# Patient Record
Sex: MALE | Race: WHITE | NOT HISPANIC OR LATINO | Employment: UNEMPLOYED | URBAN - METROPOLITAN AREA
[De-identification: names, ages, dates, MRNs, and addresses within clinical notes are randomized per-mention and may not be internally consistent; named-entity substitution may affect disease eponyms.]

---
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
## 2024-02-06 ENCOUNTER — HOSPITAL ENCOUNTER (EMERGENCY)
Facility: CLINIC | Age: 19
Discharge: HOME | End: 2024-02-06
Attending: EMERGENCY MEDICINE | Admitting: EMERGENCY MEDICINE
Payer: COMMERCIAL

## 2024-02-06 VITALS
HEIGHT: 72 IN | HEART RATE: 94 BPM | RESPIRATION RATE: 16 BRPM | BODY MASS INDEX: 27.22 KG/M2 | DIASTOLIC BLOOD PRESSURE: 77 MMHG | TEMPERATURE: 98.4 F | WEIGHT: 201 LBS | SYSTOLIC BLOOD PRESSURE: 127 MMHG | OXYGEN SATURATION: 100 %

## 2024-02-06 DIAGNOSIS — T15.01XA FOREIGN BODY OF RIGHT CORNEA, INITIAL ENCOUNTER: ICD-10-CM

## 2024-02-06 PROCEDURE — 99283 EMERGENCY DEPT VISIT LOW MDM: CPT | Performed by: EMERGENCY MEDICINE

## 2024-02-06 PROCEDURE — 99283 EMERGENCY DEPT VISIT LOW MDM: CPT

## 2024-02-06 RX ORDER — ERYTHROMYCIN 5 MG/G
0.5 OINTMENT OPHTHALMIC 4 TIMES DAILY
Qty: 3.5 G | Refills: 0 | Status: SHIPPED | OUTPATIENT
Start: 2024-02-06 | End: 2024-02-11

## 2024-02-06 RX ORDER — TETRACAINE HYDROCHLORIDE 5 MG/ML
1-2 SOLUTION OPHTHALMIC ONCE
Status: DISCONTINUED | OUTPATIENT
Start: 2024-02-06 | End: 2024-02-06 | Stop reason: HOSPADM

## 2024-02-06 NOTE — ED TRIAGE NOTES
Patient c/o right eye pain x 2.5 days. He thinks he has something in his eye.     Triage Assessment (Adult)       Row Name 02/06/24 0945          Triage Assessment    Airway WDL WDL        Respiratory WDL    Respiratory WDL WDL        Skin Circulation/Temperature WDL    Skin Circulation/Temperature WDL WDL

## 2024-02-06 NOTE — ED PROVIDER NOTES
History     chief complaint  HPI  Patient is a 18-year-old healthy male present with chief complaint of 2 days of right eye pain.  He was driving his tractor and when he came into the house he noticed right eye pain.  Feels like something is stuck in there.  No vision changes.  Continuous clear discharge.  Does not wear contacts.    Review of Systems:  All organ systems below were reviewed and are negative unless indicated in the HPI.    Constitutional  Eyes  Neuro    Allergies:  Allergies   Allergen Reactions    Amoxicillin Rash       Problem List:    Patient Active Problem List    Diagnosis Date Noted    Stress fracture of tibia or fibula 2003     Priority: Medium        Past Medical History:    History reviewed. No pertinent past medical history.    Past Surgical History:    Past Surgical History:   Procedure Laterality Date    HERNIA REPAIR, INGUINAL RT/LT  06/23/10    Right    TONSILLECTOMY & ADENOIDECTOMY  07/13/10       Family History:    Family History   Problem Relation Age of Onset    Heart Disease Paternal Aunt         MI    Hypertension Maternal Grandmother     Hyperlipidemia Maternal Grandmother     Hypertension Maternal Grandfather     Hyperlipidemia Maternal Grandfather     Hypertension Paternal Grandmother     Hyperlipidemia Paternal Grandmother     Hyperlipidemia Paternal Grandfather     Hypertension Paternal Grandfather        Medications:    erythromycin (ROMYCIN) 5 MG/GM ophthalmic ointment  cetirizine (ZYRTEC) 10 MG tablet  fluticasone (FLONASE) 50 MCG/ACT spray          Physical Exam   BP: 127/77  Pulse: 94  Temp: 98.4  F (36.9  C)  Resp: 16  Height: 182.9 cm (6')  Weight: 91.2 kg (201 lb)  SpO2: 100 %        Gen: Vital signs reviewed  Eyes: Right sclera injected.  He has metallic foreign body embedded in the right cornea off the midline at the 9 o'clock position.  Negative Francisco sign.  He also has a metallic foreign body overlying the sclera at the 7 o'clock position.  Nothing on  eyelid eversion.  He has 20/20 vision.  ENT: External ears and nares normal  Card: Appears well-perfused  Resp: No respiratory distress.   Extremities: Without obvious deformity  Skin: Dry  Neuro: Alert.    ED [facility]    Foreign Body Removal - Ocular    Date/Time: 2/6/2024 12:32 PM    Performed by: Rich Will MD  Authorized by: Rich Will MD    Risks, benefits and alternatives discussed.      LOCATION     Location:  R corneal    Depth:  Embedded    PRE PROCEDURE DETAILS:     Imaging:  None    OS visual acuity:  20/20    Correction: uncorrected      Fluorescein exam: yes      Fluorescein uptake: yes      Francisco test: negative      Corneal abrasion description:  Metallic foreign body embedded in the right cornea at the 9 o'clock position.    Corneal abrasion location:  Lateral    ANESTHESIA (see MAR for exact dosages):     Local anesthetic:  Tetracaine drops    PROCEDURE DETAILS     Localization method:  Slit lamp    Removal mechanism:  Corneal arlette and moist cotton swab    Foreign bodies recovered: Scleral foreign body removed and part of the corneal foreign body removed.    Intact foreign body removal: no      PROCEDURE    Patient Tolerance:  Patient tolerated the procedure well with no immediate complications             No results found for this or any previous visit (from the past 24 hour(s)).    Medications   tetracaine (PONTOCAINE) 0.5 % ophthalmic solution 1-2 drop (has no administration in time range)         Consultations:  None    Social Determinants of Health:  Presents with daughter and girlfriend    Assessments & Plan (with Medical Decision Making)       I have reviewed the nursing notes.    I have reviewed the findings, diagnosis, plan and need for follow up with the patient.      Medical Decision Making  On arrival, patient well-appearing.  He has a scleral foreign body and a corneal foreign body.  Scleral foreign body removed.  Part of the  corneal foreign body removed but it was too embedded to go after the rest of this.  He is not a contact lens wear.  His tetanus is up-to-date.  Prescribed him erythromycin and given instructions to follow-up with ophthalmology today or tomorrow for a more complete removal.  Discharged in stable condition with appropriate return precautions.    Final diagnoses:   Foreign body of right cornea, initial encounter         Rich Will M.D.   [facility] Emergency Department     Rich Will MD  02/06/24 6265

## 2024-02-06 NOTE — DISCHARGE INSTRUCTIONS
"Call the above number and tell them that you have a \"piece of metal stuck in your right eye\" and you were seen here.  They should be able to schedule you an appointment promptly.  If this does not work you can also go to any other eye doctor in the area.    Take your antibiotic ointment 4 times a day for 5 days.  "

## 2024-02-29 ENCOUNTER — HOSPITAL ENCOUNTER (EMERGENCY)
Facility: CLINIC | Age: 19
Discharge: HOME | End: 2024-02-29
Attending: STUDENT IN AN ORGANIZED HEALTH CARE EDUCATION/TRAINING PROGRAM | Admitting: STUDENT IN AN ORGANIZED HEALTH CARE EDUCATION/TRAINING PROGRAM
Payer: COMMERCIAL

## 2024-02-29 VITALS
HEART RATE: 75 BPM | DIASTOLIC BLOOD PRESSURE: 89 MMHG | WEIGHT: 195 LBS | TEMPERATURE: 98 F | OXYGEN SATURATION: 100 % | RESPIRATION RATE: 16 BRPM | BODY MASS INDEX: 26.41 KG/M2 | HEIGHT: 72 IN | SYSTOLIC BLOOD PRESSURE: 134 MMHG

## 2024-02-29 DIAGNOSIS — T78.40XA ALLERGIC REACTION, INITIAL ENCOUNTER: ICD-10-CM

## 2024-02-29 DIAGNOSIS — R21 FACIAL RASH: ICD-10-CM

## 2024-02-29 PROCEDURE — 99284 EMERGENCY DEPT VISIT MOD MDM: CPT | Performed by: STUDENT IN AN ORGANIZED HEALTH CARE EDUCATION/TRAINING PROGRAM

## 2024-02-29 PROCEDURE — 258N000003 HC RX IP 258 OP 636: Performed by: STUDENT IN AN ORGANIZED HEALTH CARE EDUCATION/TRAINING PROGRAM

## 2024-02-29 PROCEDURE — 99284 EMERGENCY DEPT VISIT MOD MDM: CPT | Mod: 25

## 2024-02-29 PROCEDURE — 96361 HYDRATE IV INFUSION ADD-ON: CPT

## 2024-02-29 PROCEDURE — 96374 THER/PROPH/DIAG INJ IV PUSH: CPT

## 2024-02-29 PROCEDURE — 96375 TX/PRO/DX INJ NEW DRUG ADDON: CPT

## 2024-02-29 PROCEDURE — 250N000011 HC RX IP 250 OP 636: Performed by: STUDENT IN AN ORGANIZED HEALTH CARE EDUCATION/TRAINING PROGRAM

## 2024-02-29 RX ORDER — DIPHENHYDRAMINE HYDROCHLORIDE 50 MG/ML
50 INJECTION INTRAMUSCULAR; INTRAVENOUS ONCE
Status: COMPLETED | OUTPATIENT
Start: 2024-02-29 | End: 2024-02-29

## 2024-02-29 RX ORDER — PREDNISONE 20 MG/1
TABLET ORAL
Qty: 10 TABLET | Refills: 0 | Status: SHIPPED | OUTPATIENT
Start: 2024-02-29

## 2024-02-29 RX ORDER — LORATADINE 10 MG/1
10 TABLET ORAL DAILY
Qty: 30 TABLET | Refills: 0 | Status: SHIPPED | OUTPATIENT
Start: 2024-02-29

## 2024-02-29 RX ORDER — METHYLPREDNISOLONE SODIUM SUCCINATE 125 MG/2ML
125 INJECTION, POWDER, LYOPHILIZED, FOR SOLUTION INTRAMUSCULAR; INTRAVENOUS ONCE
Status: COMPLETED | OUTPATIENT
Start: 2024-02-29 | End: 2024-02-29

## 2024-02-29 RX ORDER — EPINEPHRINE 0.3 MG/.3ML
0.3 INJECTION SUBCUTANEOUS
Qty: 2 EACH | Refills: 0 | Status: SHIPPED | OUTPATIENT
Start: 2024-02-29

## 2024-02-29 RX ORDER — FAMOTIDINE 20 MG/1
20 TABLET, FILM COATED ORAL DAILY
Qty: 10 TABLET | Refills: 0 | Status: SHIPPED | OUTPATIENT
Start: 2024-02-29

## 2024-02-29 RX ADMIN — SODIUM CHLORIDE 1000 ML: 9 INJECTION, SOLUTION INTRAVENOUS at 15:25

## 2024-02-29 RX ADMIN — DIPHENHYDRAMINE HYDROCHLORIDE 50 MG: 50 INJECTION, SOLUTION INTRAMUSCULAR; INTRAVENOUS at 15:25

## 2024-02-29 RX ADMIN — METHYLPREDNISOLONE SODIUM SUCCINATE 125 MG: 125 INJECTION, POWDER, FOR SOLUTION INTRAMUSCULAR; INTRAVENOUS at 15:25

## 2024-02-29 ASSESSMENT — COLUMBIA-SUICIDE SEVERITY RATING SCALE - C-SSRS
1. IN THE PAST MONTH, HAVE YOU WISHED YOU WERE DEAD OR WISHED YOU COULD GO TO SLEEP AND NOT WAKE UP?: NO
6. HAVE YOU EVER DONE ANYTHING, STARTED TO DO ANYTHING, OR PREPARED TO DO ANYTHING TO END YOUR LIFE?: NO
2. HAVE YOU ACTUALLY HAD ANY THOUGHTS OF KILLING YOURSELF IN THE PAST MONTH?: NO

## 2024-02-29 ASSESSMENT — ACTIVITIES OF DAILY LIVING (ADL)
ADLS_ACUITY_SCORE: 35
ADLS_ACUITY_SCORE: 33
ADLS_ACUITY_SCORE: 35

## 2024-02-29 NOTE — ED TRIAGE NOTES
Pt has been congested and taking otc meds for congestion - last night into today noting rash on face and down neck. Denies throat swelling or difficulty breathing.      Triage Assessment (Adult)       Row Name 02/29/24 1401          Triage Assessment    Airway WDL WDL        Respiratory WDL    Respiratory WDL WDL        Cardiac WDL    Cardiac WDL WDL

## 2024-02-29 NOTE — DISCHARGE INSTRUCTIONS
Please follow-up with your primary care doctor to discuss allergy testing.  Please take the steroids that were prescribed to you.  Otherwise use Benadryl if you do not have any physical labor to do every 6 hours to help with the rash otherwise can take a daily antihistamine like loratadine which was prescribed to as well.  If any symptoms worsen or you start having any involvement of your lips tongue or breathing please return for reevaluation.  You are also sent home with an EpiPen which should be used if you start having any involvement of your breathing and or lip swelling/tongue swelling.

## 2024-02-29 NOTE — ED PROVIDER NOTES
History     Chief Complaint   Patient presents with    Allergic Reaction     HPI  Devon Osorio is a 19 year old male who presenting with concern of possible drug reaction.  He notes he started Flonase as well as a sinus-like medication yesterday and notes that the rash started this morning around his eyes and neck.  He does not have any lip swelling tongue swelling difficulty breathing drooling or any shortness of breath.  He denies any abdominal pains nausea vomiting or diarrhea.  He does get a rash with amoxicillin but is not taking any antibiotics recently.  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    Allergies:  Allergies   Allergen Reactions    Amoxicillin Rash       Problem List:    Patient Active Problem List    Diagnosis Date Noted    Stress fracture of tibia or fibula 2003     Priority: Medium        Past Medical History:    History reviewed. No pertinent past medical history.    Past Surgical History:    Past Surgical History:   Procedure Laterality Date    HERNIA REPAIR, INGUINAL RT/LT  06/23/10    Right    TONSILLECTOMY & ADENOIDECTOMY  07/13/10       Family History:    Family History   Problem Relation Age of Onset    Heart Disease Paternal Aunt         MI    Hypertension Maternal Grandmother     Hyperlipidemia Maternal Grandmother     Hypertension Maternal Grandfather     Hyperlipidemia Maternal Grandfather     Hypertension Paternal Grandmother     Hyperlipidemia Paternal Grandmother     Hyperlipidemia Paternal Grandfather     Hypertension Paternal Grandfather        Social History:  Marital Status:  Single [1]  Social History     Tobacco Use    Smoking status: Never     Smokeless tobacco: Never   Substance Use Topics    Alcohol use: No    Drug use: No        Medications:    EPINEPHrine (ANY BX GENERIC EQUIV) 0.3 MG/0.3ML injection 2-pack  famotidine (PEPCID) 20 MG tablet  loratadine (CLARITIN) 10 MG tablet  predniSONE (DELTASONE) 20 MG tablet  cetirizine (ZYRTEC) 10 MG tablet  fluticasone (FLONASE) 50 MCG/ACT spray          Review of Systems   Skin:  Positive for rash.   All other systems reviewed and are negative.      Physical Exam   BP: (!) 135/95  Pulse: 104  Temp: 98  F (36.7  C)  Resp: 18  Height: 182.9 cm (6')  Weight: 88.5 kg (195 lb)  SpO2: 98 %      Physical Exam  Vitals and nursing note reviewed.   Constitutional:       Appearance: Normal appearance. He is normal weight.   Neurological:      Mental Status: He is alert.         ED Course        Procedures             No results found for this or any previous visit (from the past 24 hour(s)).    Medications   sodium chloride 0.9% BOLUS 1,000 mL (1,000 mLs Intravenous $New Bag 2/29/24 1525)   diphenhydrAMINE (BENADRYL) injection 50 mg (50 mg Intravenous $Given 2/29/24 1525)   methylPREDNISolone sodium succinate (solu-MEDROL) injection 125 mg (125 mg Intravenous $Given 2/29/24 1525)       Assessments & Plan (with Medical Decision Making)     I have reviewed the nursing notes.    I have reviewed the findings, diagnosis, plan and need for follow up with the patient.      Medical Decision Making  Pleasant 19-year-old male presenting with facial rash as well as neck rash.  He was taking Flonase as well as a oral medication for sinusitis.  He has not had any involvement of his oral cavity his tongue or breathing.  He has no nausea vomiting diarrhea.  Has no rashes anywhere else on his body.  Patient was provided with methylprednisolone and Benadryl which after about an hour improved his rash.  He was provided a liter of IV fluids.  At this time believe patient stable for discharge home.  Recommend outpatient follow-up with  allergist/PCP as well as provided patient with continued course of prednisone, famotidine, loratadine and a EpiPen.  Return precautions discussed as well as supportive care measures and the medications are provided to patient.  Patient understands recommendations and feels comfortable discharging home and discharged home    New Prescriptions    EPINEPHRINE (ANY BX GENERIC EQUIV) 0.3 MG/0.3ML INJECTION 2-PACK    Inject 0.3 mLs (0.3 mg) into the muscle once as needed for anaphylaxis May repeat one time in 5-15 minutes if response to initial dose is inadequate.    FAMOTIDINE (PEPCID) 20 MG TABLET    Take 1 tablet (20 mg) by mouth daily    LORATADINE (CLARITIN) 10 MG TABLET    Take 1 tablet (10 mg) by mouth daily    PREDNISONE (DELTASONE) 20 MG TABLET    Take two tablets (= 40mg) each day for 5 (five) days       Final diagnoses:   Allergic reaction, initial encounter   Facial rash       2/29/2024   [facility] EMERGENCY DEPT       Kenton Carlos MD  02/29/24 8308

## 2024-04-13 ENCOUNTER — HEALTH MAINTENANCE LETTER (OUTPATIENT)
Age: 19
End: 2024-04-13

## 2025-04-19 ENCOUNTER — HEALTH MAINTENANCE LETTER (OUTPATIENT)
Age: 20
End: 2025-04-19